# Patient Record
Sex: MALE | Race: WHITE
[De-identification: names, ages, dates, MRNs, and addresses within clinical notes are randomized per-mention and may not be internally consistent; named-entity substitution may affect disease eponyms.]

---

## 2021-11-28 ENCOUNTER — HOSPITAL ENCOUNTER (EMERGENCY)
Dept: HOSPITAL 41 - JD.ED | Age: 31
Discharge: HOME | End: 2021-11-28
Payer: MEDICAID

## 2021-11-28 DIAGNOSIS — K04.7: Primary | ICD-10-CM

## 2021-11-28 DIAGNOSIS — Z72.0: ICD-10-CM

## 2021-11-28 PROCEDURE — 96372 THER/PROPH/DIAG INJ SC/IM: CPT

## 2021-11-28 PROCEDURE — 99282 EMERGENCY DEPT VISIT SF MDM: CPT

## 2021-11-28 NOTE — EDM.PDOC
ED HPI GENERAL MEDICAL PROBLEM





- General


Chief Complaint: ENT Problem


Stated Complaint: TOOTH ABCESS/VOMITTING


Time Seen by Provider: 11/28/21 22:19


Source of Information: Reports: Patient


History Limitations: Reports: No Limitations





- History of Present Illness


INITIAL COMMENTS - FREE TEXT/NARRATIVE: 


31-year-old male presents the emergency department today with complaints of left

lower jaw/tooth pain that started approximately 3 to 4 days ago and became worse

tonight.  Patient states that approximately a year ago the tooth had broken off 

and he has not had it repaired.  States that he did drink a few beers tonight 

and fell asleep and woke up and began throwing up because of the tooth.  He has 

not had any fever or chills.  He has not had any headache.


  ** Left Oral/Mouth


Pain Score (Numeric/FACES): 8





- Related Data


                                    Allergies











Allergy/AdvReac Type Severity Reaction Status Date / Time


 


No Known Allergies Allergy   Verified 11/28/21 22:24











Home Meds: 


                                    Home Meds





. [No Known Home Meds]  11/28/21 [History]











Past Medical History





- Past Surgical History


GI Surgical History: Reports: Appendectomy





Social & Family History





- Tobacco Use


Tobacco Use Status *Q: Current Every Day Tobacco User


Years of Tobacco use: 15


Packs/Tins Daily: 1





- Caffeine Use


Caffeine Use: Reports: Coffee, Energy Drinks, Soda





- Recreational Drug Use


Recreational Drug Use: No





ED ROS ENT





- Review of Systems


Review Of Systems: Comprehensive ROS is negative, except as noted in HPI.





ED EXAM, ENT





- Physical Exam


Exam: See Below


Exam Limited By: No Limitations


General Appearance: Alert, WD/WN, No Apparent Distress


Ears: Normal External Exam, Hearing Grossly Normal


Nose: Normal Inspection


Mouth/Throat: Normal Inspection, Normal Lips, Dental Pain, Dental Tenderness 

(Long tooth #18), Gum Swelling (Along tooth #18).  No: Normal Teeth (Tooth #18 

broken on the lateral aspect)


Head: Atraumatic, Normocephalic


Neck: Normal Inspection, Supple, Non-Tender, Full Range of Motion.  No: 

Lymphadenopathy (L), Lymphadenopathy (R)


Respiratory/Chest: No Respiratory Distress, No Accessory Muscle Use


Cardiovascular: Normal Peripheral Pulses, Regular Rate, Rhythm


GI/Abdominal: No Distention


 (Male) Exam: Deferred


Rectal (Males) Exam: Deferred


Back: Normal Inspection


Extremities: Normal Inspection


Neurological: Alert, Oriented, Normal Cognition


Psychiatric: Normal Affect, Normal Mood


Skin: Warm, Dry, Intact, Normal Color, No Rash


Lymphatic: No Adenopathy





Course





- Vital Signs


Text/Narrative:: 





As stated above patient presents with a 3 to 4-day history of tooth pain.  Upon 

exam, patient is found sleeping in bed and does not appear to be in any 

discomfort.  The room does smell of alcohol.  The patient does have a broken 

tooth noted to number 18 on the lateral aspect of the tooth.  Gumline on the 

lateral aspect of this tooth is red and swollen however I do not appreciate any 

drainage.  No swelling noted to jaw or lymph nodes.  We will give the patient 

Toradol 60 mg IM x1 dose.  Patient will be given a prescription for clindamycin 

and a few tabs of hydrocodone upon discharge to home.


Last Recorded V/S: 


                                Last Vital Signs











Temp  97.0 F   11/28/21 22:22


 


Pulse  61   11/28/21 22:22


 


Resp  18   11/28/21 22:22


 


BP  144/103 H  11/28/21 22:22


 


Pulse Ox  97   11/28/21 22:22














- Orders/Labs/Meds


Meds: 


Medications














Discontinued Medications














Generic Name Dose Route Start Last Admin





  Trade Name Freq  PRN Reason Stop Dose Admin


 


Ketorolac Tromethamine  60 mg  11/28/21 22:38 





  Ketorolac 60 Mg/2 Ml Sdv  IM  11/28/21 22:39 





  ONETIME ONE  














Departure





- Departure


Time of Disposition: 22:43


Disposition: Home, Self-Care 01


Condition: Good


Clinical Impression: 


 Dental abscess








- Discharge Information


Instructions:  Dental Abscess, Easy-to-Read


Referrals: 


PCP,None [Primary Care Provider] - 


Forms:  ED Department Discharge


Additional Instructions: 


You were seen in the emergency department this evening with pain noted to your 

second to last tooth left bottom side.  Evaluation was completed.  Do not 

appreciate any swelling of your jaw.  Do not appreciate significant lymph node 

swelling that would indicate that you have a severe infection.  You were given 

pain medication while in the emergency department.  I have given you a 

prescription for an antibiotic medication called clindamycin.  You will need to 

take 1 tablet 4 times daily for a total of 7 days.  Be sure to complete the full

course of antibiotics to clear up the infection.  It would likely take a couple 

of days time for you to notice the antibiotics are working.  I have given a 

prescription for narcotic pain medication called hydrocodone for you to use for 

the next couple of days.  Do not take this medication tonight as you have been 

drinking alcohol.  Starting tomorrow, you may take 1 tab every 4-6 hours as 

needed for more severe pain.  After a couple of days he likely should not need 

the pain medication as the antibiotics will take effect and begin to clear up 

the infection.  At that time you may take Tylenol 650 mg every 4 hours or 

ibuprofen 600 mg every 6-8 hours for discomfort.  Recommend scheduling a dentist

appointment first thing tomorrow as it does take some time to get into see a 

dentist.





Sepsis Event Note (ED)





- Focused Exam


Vital Signs: 


                                   Vital Signs











  Temp Pulse Resp BP Pulse Ox


 


 11/28/21 22:22  97.0 F  61  18  144/103 H  97

## 2022-03-03 ENCOUNTER — HOSPITAL ENCOUNTER (EMERGENCY)
Dept: HOSPITAL 41 - JD.ED | Age: 32
Discharge: HOME | End: 2022-03-03
Payer: MEDICAID

## 2022-03-03 DIAGNOSIS — F10.230: ICD-10-CM

## 2022-03-03 DIAGNOSIS — F11.23: Primary | ICD-10-CM

## 2022-03-03 DIAGNOSIS — Z72.0: ICD-10-CM

## 2022-03-03 PROCEDURE — 99285 EMERGENCY DEPT VISIT HI MDM: CPT

## 2022-03-03 PROCEDURE — 96374 THER/PROPH/DIAG INJ IV PUSH: CPT

## 2022-03-03 PROCEDURE — 86140 C-REACTIVE PROTEIN: CPT

## 2022-03-03 PROCEDURE — 83690 ASSAY OF LIPASE: CPT

## 2022-03-03 PROCEDURE — 82009 KETONE BODYS QUAL: CPT

## 2022-03-03 PROCEDURE — 80307 DRUG TEST PRSMV CHEM ANLYZR: CPT

## 2022-03-03 PROCEDURE — 80306 DRUG TEST PRSMV INSTRMNT: CPT

## 2022-03-03 PROCEDURE — 80053 COMPREHEN METABOLIC PANEL: CPT

## 2022-03-03 PROCEDURE — 85025 COMPLETE CBC W/AUTO DIFF WBC: CPT

## 2022-03-03 PROCEDURE — 83735 ASSAY OF MAGNESIUM: CPT

## 2022-03-03 PROCEDURE — 93005 ELECTROCARDIOGRAM TRACING: CPT

## 2022-03-03 PROCEDURE — 36415 COLL VENOUS BLD VENIPUNCTURE: CPT

## 2022-05-17 ENCOUNTER — HOSPITAL ENCOUNTER (EMERGENCY)
Dept: HOSPITAL 41 - JD.ED | Age: 32
Discharge: LEFT BEFORE DISCHARGE | End: 2022-05-17
Payer: MEDICAID

## 2022-05-17 DIAGNOSIS — R69: Primary | ICD-10-CM

## 2022-05-17 DIAGNOSIS — F17.210: ICD-10-CM

## 2022-05-17 PROCEDURE — 83735 ASSAY OF MAGNESIUM: CPT

## 2022-05-17 PROCEDURE — 85025 COMPLETE CBC W/AUTO DIFF WBC: CPT

## 2022-05-17 PROCEDURE — 99283 EMERGENCY DEPT VISIT LOW MDM: CPT

## 2022-05-17 PROCEDURE — 83690 ASSAY OF LIPASE: CPT

## 2022-05-17 PROCEDURE — 81003 URINALYSIS AUTO W/O SCOPE: CPT

## 2022-05-17 PROCEDURE — 84443 ASSAY THYROID STIM HORMONE: CPT

## 2022-05-17 PROCEDURE — 80306 DRUG TEST PRSMV INSTRMNT: CPT

## 2022-05-17 PROCEDURE — 80053 COMPREHEN METABOLIC PANEL: CPT

## 2022-05-17 PROCEDURE — 80307 DRUG TEST PRSMV CHEM ANLYZR: CPT

## 2022-05-17 PROCEDURE — 36415 COLL VENOUS BLD VENIPUNCTURE: CPT
